# Patient Record
Sex: FEMALE | Race: WHITE | NOT HISPANIC OR LATINO | URBAN - METROPOLITAN AREA
[De-identification: names, ages, dates, MRNs, and addresses within clinical notes are randomized per-mention and may not be internally consistent; named-entity substitution may affect disease eponyms.]

---
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
## 2021-10-25 NOTE — PROCEDURE: MEDICATION COUNSELING
Spoke w/ pt regarding UTI and recommended tx w/ Cipro 250mg BIDx3d. Denies drug allergies and pharmacy confirmed. Aware of pyelo precautions and when to present to ED. States she is f/up w/ urology on Wednesday d/t to difficulty fully emptying bladder. No further questions at this time.    Sski Pregnancy And Lactation Text: This medication is Pregnancy Category D and isn't considered safe during pregnancy. It is excreted in breast milk.

## 2021-10-25 NOTE — PROCEDURE: MEDICATION COUNSELING
Chemical Peel: TCA 33% Prep: The treated area was degreased with pre-peel cleanser, and vaseline was applied for protection of mucous membranes. Consent: Prior to the procedure, written consent was obtained and risks were reviewed, including but not limited to: redness, peeling, blistering, pigmentary change, scarring, infection, and pain. Detail Level: Zone Post Peel Care: After the procedure, a post-peel cream was applied to the treated areas. Sun protection and post-care instructions were reviewed with the patient. Number Of Layers: 4 Treatment Number: 0 Post-Care Instructions: I reviewed with the patient in detail post-care instructions. Patient should avoid sun exposure and wear sun protection. Comments: [id number] Itraconazole Pregnancy And Lactation Text: This medication is Pregnancy Category C and it isn't know if it is safe during pregnancy. It is also excreted in breast milk.

## 2022-03-21 NOTE — PROCEDURE: MEDICATION COUNSELING
Hydroquinone Counseling:  Patient advised that medication may result in skin irritation, lightening (hypopigmentation), dryness, and burning.  In the event of skin irritation, the patient was advised to reduce the amount of the drug applied or use it less frequently.  Rarely, spots that are treated with hydroquinone can become darker (pseudoochronosis).  Should this occur, patient instructed to stop medication and call the office. The patient verbalized understanding of the proper use and possible adverse effects of hydroquinone.  All of the patient's questions and concerns were addressed. High Dose Vitamin A Counseling: Side effects reviewed, pt to contact office should one occur.

## 2022-07-28 NOTE — PROCEDURE: MIPS QUALITY
Health Maintenance Due   Topic Date Due    Hepatitis C Screening  Never done    Pneumococcal Vaccines (Age 0-64) (1 - PCV) Never done    HIV Screening  Never done    Chlamydia Screening  Never done    COVID-19 Vaccine (3 - Booster for Pfizer series) 2021       Chart review completed 2022.  Care Everywhere updates requested and reviewed.  Immunizations reconciled. Media reports reviewed.  Duplicate HM overrides and  orders removed.  Overdue HM topic chart audit and/or requested.  Overdue lab testing linked to upcoming lab appointments if applies.        
Quality 402: Tobacco Use And Help With Quitting Among Adolescents: Patient screened for tobacco and never smoked
Detail Level: Zone

## 2024-02-28 NOTE — PROCEDURE: MEDICATION COUNSELING
[Negative] : Heme/Lymph Erivedge Counseling- I discussed with the patient the risks of Erivedge including but not limited to nausea, vomiting, diarrhea, constipation, weight loss, changes in the sense of taste, decreased appetite, muscle spasms, and hair loss.  The patient verbalized understanding of the proper use and possible adverse effects of Erivedge.  All of the patient's questions and concerns were addressed.

## 2024-06-06 NOTE — PROCEDURE: MEDICATION COUNSELING
From: Emily Lentz  To: Madelin Shaikh DO  Sent: 6/6/2024 10:59 AM CDT  Subject: Clonazepam    Brad BAY , I was in to see Bushra about some things and I know she had related some to you and I am having alot going on in my life and ALOT of stress and anxiety right now - I was wondering if I could get my Clonazepam upped to 1mg right now for a period of time , My grandpa passed yesterday and I am having a really hard time with it. We have a appointment next week Friday together and I look forward to it to see what we can do to help me.     Thank you.    Gabapentin Pregnancy And Lactation Text: This medication is Pregnancy Category C and isn't considered safe during pregnancy. It is excreted in breast milk.